# Patient Record
Sex: MALE | Race: WHITE | NOT HISPANIC OR LATINO | Employment: FULL TIME | URBAN - METROPOLITAN AREA
[De-identification: names, ages, dates, MRNs, and addresses within clinical notes are randomized per-mention and may not be internally consistent; named-entity substitution may affect disease eponyms.]

---

## 2024-03-25 ENCOUNTER — OFFICE VISIT (OUTPATIENT)
Age: 39
End: 2024-03-25
Payer: COMMERCIAL

## 2024-03-25 VITALS
WEIGHT: 249 LBS | DIASTOLIC BLOOD PRESSURE: 88 MMHG | HEART RATE: 78 BPM | HEIGHT: 76 IN | SYSTOLIC BLOOD PRESSURE: 132 MMHG | OXYGEN SATURATION: 96 % | BODY MASS INDEX: 30.32 KG/M2

## 2024-03-25 DIAGNOSIS — I10 PRIMARY HYPERTENSION: ICD-10-CM

## 2024-03-25 DIAGNOSIS — E78.2 MIXED HYPERLIPIDEMIA: ICD-10-CM

## 2024-03-25 DIAGNOSIS — J30.9 ALLERGIC RHINITIS, UNSPECIFIED SEASONALITY, UNSPECIFIED TRIGGER: ICD-10-CM

## 2024-03-25 DIAGNOSIS — J45.20 MILD INTERMITTENT ASTHMA, UNSPECIFIED WHETHER COMPLICATED: ICD-10-CM

## 2024-03-25 DIAGNOSIS — E11.9 TYPE 2 DIABETES MELLITUS WITHOUT COMPLICATION, WITHOUT LONG-TERM CURRENT USE OF INSULIN: Primary | ICD-10-CM

## 2024-03-25 PROBLEM — I25.10 CORONARY ARTERY DISEASE INVOLVING NATIVE CORONARY ARTERY OF NATIVE HEART WITHOUT ANGINA PECTORIS: Status: ACTIVE | Noted: 2023-11-30

## 2024-03-25 PROBLEM — Z82.49 FAMILY HISTORY OF PREMATURE CAD: Status: ACTIVE | Noted: 2021-06-21

## 2024-03-25 PROBLEM — I47.29 NONSUSTAINED PAROXYSMAL VENTRICULAR TACHYCARDIA: Status: ACTIVE | Noted: 2019-02-25

## 2024-03-25 LAB
EXPIRATION DATE: ABNORMAL
HBA1C MFR BLD: 7 % (ref 4.5–5.7)
Lab: ABNORMAL

## 2024-03-25 RX ORDER — HYDROCHLOROTHIAZIDE 25 MG/1
25 TABLET ORAL DAILY
COMMUNITY
Start: 2024-02-24

## 2024-03-25 RX ORDER — TIRZEPATIDE 5 MG/.5ML
5 INJECTION, SOLUTION SUBCUTANEOUS WEEKLY
COMMUNITY
Start: 2024-01-19 | End: 2024-03-25

## 2024-03-25 RX ORDER — VALSARTAN 160 MG/1
160 TABLET ORAL DAILY
COMMUNITY
Start: 2024-03-25

## 2024-03-25 RX ORDER — PROPRANOLOL HYDROCHLORIDE 10 MG/1
10 TABLET ORAL AS NEEDED
COMMUNITY

## 2024-03-25 RX ORDER — AMLODIPINE BESYLATE 10 MG/1
10 TABLET ORAL DAILY
COMMUNITY
Start: 2023-11-27

## 2024-03-25 RX ORDER — ASPIRIN 81 MG/1
81 TABLET, COATED ORAL DAILY
COMMUNITY
Start: 2024-03-18

## 2024-03-25 RX ORDER — ALBUTEROL SULFATE 90 UG/1
2 AEROSOL, METERED RESPIRATORY (INHALATION) EVERY 4 HOURS PRN
COMMUNITY
Start: 2024-03-25

## 2024-03-25 RX ORDER — METFORMIN HYDROCHLORIDE 500 MG/1
1000 TABLET, EXTENDED RELEASE ORAL
COMMUNITY
Start: 2024-03-18

## 2024-03-25 RX ORDER — SIMVASTATIN 20 MG
20 TABLET ORAL DAILY
COMMUNITY
Start: 2023-10-09 | End: 2024-10-09

## 2024-03-25 NOTE — PROGRESS NOTES
New Patient Note    Subjective      Johnny is a 39 y.o. male.    Chief Complaint   Patient presents with    Establish Care    Wheezing     Not sure if it is allergies. Tested negative.        HPI    Lived in [place] previously.     Diabetes Type II  - dx around 9 years ago  - Metformin 1000mg ER  - started Dec Mounjaro, helpful with weight, back ordered for 6-8 weeks,   - very happy with DM control and weight impact  - last eye exam about 18months ago  - no difficulty with feet, aquaphor  - no neuropathy    HTN  - Amlodipine 10mg, HCTZ 25, Propranolol 10 PRN anxiety, Valsartan 160  - no cp, edema, no HA from BP    Anxiety  - stopped Welbutrin awhile ago, no big differenc  - Propranolol PRN  - worry around health  - worry with fam history of heart disease    HPL  - on Simvastatin     Allergic Rhinitis  - wife sick  - some persistent runny nose and cough  - Albuterol for wheeze  - Zyrtec on occasion  - no nasal spray  - bad asthma as child    Transaminitis  - ALT 60  - US of liver fatty liver   - no further workup    Past Medical History:  Patient Active Problem List   Diagnosis    Coronary artery disease involving native coronary artery of native heart without angina pectoris    Family history of premature CAD    Mixed hyperlipidemia    Nonsustained paroxysmal ventricular tachycardia    Type 2 diabetes mellitus    White coat syndrome with diagnosis of hypertension        Medications:    Current Outpatient Medications:     amLODIPine (NORVASC) 10 MG tablet, Take 1 tablet by mouth Daily., Disp: , Rfl:     Aspirin Low Dose 81 MG EC tablet, Take 1 tablet by mouth Daily., Disp: , Rfl:     hydroCHLOROthiazide 25 MG tablet, Take 1 tablet by mouth Daily., Disp: , Rfl:     metFORMIN ER (GLUCOPHAGE-XR) 500 MG 24 hr tablet, Take 2 tablets by mouth Daily With Breakfast., Disp: , Rfl:     propranolol (INDERAL) 10 MG tablet, Take 1 tablet by mouth As Needed., Disp: , Rfl:     simvastatin (ZOCOR) 20 MG tablet, Take 1 tablet by  "mouth Daily., Disp: , Rfl:     albuterol sulfate  (90 Base) MCG/ACT inhaler, Inhale 2 puffs Every 4 (Four) Hours As Needed for Wheezing., Disp: , Rfl:     Tirzepatide (Mounjaro) 2.5 MG/0.5ML solution pen-injector pen, Inject 0.5 mL under the skin into the appropriate area as directed 1 (One) Time Per Week., Disp: 2 mL, Rfl: 1    valsartan (DIOVAN) 160 MG tablet, Take 1 tablet by mouth Daily., Disp: , Rfl:      Allergy to Medications:  Allergies   Allergen Reactions    Atorvastatin Myalgia    Escitalopram Other (See Comments)     ED    Lisinopril Nausea Only     Lightheaded, sob    Sertraline Other (See Comments)     ED        Immunizations:  Immunization History   Administered Date(s) Administered    COVID-19 (PFIZER) Purple Cap Monovalent 04/10/2021, 05/01/2021       Surgeries:  History reviewed. No pertinent surgical history.     Family History:  Family History   Problem Relation Age of Onset    Heart disease Mother     Coronary artery disease Other         Multiple family members with early CAD on Dad's side    Colon cancer Neg Hx     Prostate cancer Neg Hx     Stroke Neg Hx         Social:  Social History     Socioeconomic History    Marital status:    Tobacco Use    Smoking status: Never    Smokeless tobacco: Never   Vaping Use    Vaping status: Never Used   Substance and Sexual Activity    Alcohol use: Not Currently    Drug use: Never    Sexual activity: Yes     Partners: Female       Household:  Occupation:  Hobbies:  Exercise:      Objective   /88   Pulse 78   Ht 193 cm (76\")   Wt 113 kg (249 lb)   SpO2 96%   BMI 30.31 kg/m²   BMI is >= 30 and <35. (Class 1 Obesity). The following options were offered after discussion;: exercise counseling/recommendations and nutrition counseling/recommendations       Physical Exam  Vitals reviewed.   Constitutional:       General: He is not in acute distress.  HENT:      Nose: Nose normal.      Mouth/Throat:      Mouth: Mucous membranes are moist. "   Eyes:      Conjunctiva/sclera: Conjunctivae normal.   Cardiovascular:      Rate and Rhythm: Normal rate and regular rhythm.      Heart sounds: Normal heart sounds. No murmur heard.  Pulmonary:      Effort: Pulmonary effort is normal. No respiratory distress.      Breath sounds: Normal breath sounds.   Abdominal:      General: Abdomen is flat. Bowel sounds are normal. There is no distension.      Palpations: Abdomen is soft.      Tenderness: There is no abdominal tenderness.   Skin:     General: Skin is warm and dry.   Neurological:      Mental Status: He is alert.   Psychiatric:         Thought Content: Thought content normal.         Judgment: Judgment normal.         Assessment & Plan     Diabetes Type II  - Controlled  - A1c today 7, pt reports this is up from in the 5s, he has not been able to find Mounjaro 7.5mg, so he has been out of Mounjaro  - continue Metformin 1000mg ER  - started Mounjaro in Dec, helpful with weight, back ordered for 6-8 weeks, will resume today at Mounjaro 2.5mg, script sent  - last eye exam about 18months ago  - will perform DM foot exam next visit  - checks feet  - microalbumin obtained today  - reviewed OSH 12/2023 Lipids and CMP  - no neuropathy    HTN  - Continue Amlodipine 10mg, HCTZ 25, Valsartan 160mg daily, Propranolol 10 PRN anxiety    Anxiety  - stopped Welbutrin awhile ago, no big difference noted  - continue Propranolol PRN    HPL  - continue Simvastatin   - Lipids checked 12/2023    Allergic Rhinitis  - start Zyrtec    Intermittent Asthma  - Albuterol PRN, current use is BID  - advised if that continues should consider controller with steroid inhaler or Singulair    Transaminitis  - ALT 60  - US of liver indicated fatty liver   - no further workup completed  - last CMP 12/2023    CAD, Nonsustained Paroxysmal VT  - dx came across in EPIC post visit, will discuss further at next appt  - significant fam history of early CAD    Health Care Maintenance:  Hep C  Screen:declined    Colon Ca Screening: Start Screening at 44yo / no fam history    Immunizations:Tdap 2018, will discuss Hep B     Lipids:12/2023  A1C:today    FU in 3mo for annual, DM2    Emma Kathleen MD, FAAP, FACP  Internal Medicine and Pediatrics  [facility]

## 2024-03-27 DIAGNOSIS — E11.9 TYPE 2 DIABETES MELLITUS WITHOUT COMPLICATION, WITHOUT LONG-TERM CURRENT USE OF INSULIN: ICD-10-CM

## 2024-03-27 PROCEDURE — 82043 UR ALBUMIN QUANTITATIVE: CPT | Performed by: INTERNAL MEDICINE

## 2024-03-28 LAB — ALBUMIN UR-MCNC: 4.7 MG/DL

## 2024-04-30 ENCOUNTER — PATIENT MESSAGE (OUTPATIENT)
Age: 39
End: 2024-04-30
Payer: COMMERCIAL

## 2024-04-30 DIAGNOSIS — E11.65 TYPE 2 DIABETES MELLITUS WITH HYPERGLYCEMIA, WITHOUT LONG-TERM CURRENT USE OF INSULIN: Primary | ICD-10-CM

## 2024-06-25 ENCOUNTER — OFFICE VISIT (OUTPATIENT)
Age: 39
End: 2024-06-25
Payer: COMMERCIAL

## 2024-06-25 VITALS
HEART RATE: 76 BPM | DIASTOLIC BLOOD PRESSURE: 90 MMHG | SYSTOLIC BLOOD PRESSURE: 144 MMHG | HEIGHT: 76 IN | BODY MASS INDEX: 29.96 KG/M2 | WEIGHT: 246 LBS | RESPIRATION RATE: 16 BRPM | OXYGEN SATURATION: 96 %

## 2024-06-25 DIAGNOSIS — E78.2 MIXED HYPERLIPIDEMIA: ICD-10-CM

## 2024-06-25 DIAGNOSIS — E11.9 TYPE 2 DIABETES MELLITUS WITHOUT COMPLICATION, UNSPECIFIED WHETHER LONG TERM INSULIN USE: ICD-10-CM

## 2024-06-25 DIAGNOSIS — Z00.00 WELLNESS EXAMINATION: Primary | ICD-10-CM

## 2024-06-25 DIAGNOSIS — Z23 ENCOUNTER FOR ADMINISTRATION OF VACCINE: ICD-10-CM

## 2024-06-25 LAB
EXPIRATION DATE: ABNORMAL
HBA1C MFR BLD: 7.3 % (ref 4.5–5.7)
Lab: ABNORMAL

## 2024-06-25 PROCEDURE — 90677 PCV20 VACCINE IM: CPT | Performed by: INTERNAL MEDICINE

## 2024-06-25 PROCEDURE — 90471 IMMUNIZATION ADMIN: CPT | Performed by: INTERNAL MEDICINE

## 2024-06-25 PROCEDURE — 83036 HEMOGLOBIN GLYCOSYLATED A1C: CPT | Performed by: INTERNAL MEDICINE

## 2024-06-25 PROCEDURE — 99396 PREV VISIT EST AGE 40-64: CPT | Performed by: INTERNAL MEDICINE

## 2024-06-25 RX ORDER — EZETIMIBE 10 MG/1
10 TABLET ORAL DAILY
COMMUNITY
Start: 2024-06-25

## 2024-06-25 NOTE — LETTER
[facility]  Vaccine Consent Form    Patient Name:  Johnny Esteves  Patient :  1985     Vaccine(s) Ordered    Pneumococcal Conjugate Vaccine 20-Valent All        Screening Checklist  The following questions should be completed prior to vaccination. If you answer “yes” to any question, it does not necessarily mean you should not be vaccinated. It just means we may need to clarify or ask more questions. If a question is unclear, please ask your healthcare provider to explain it.    Yes No   Any fever or moderate to severe illness today (mild illness and/or antibiotic treatment are not contraindications)?     Do you have a history of a serious reaction to any previous vaccinations, such as anaphylaxis, encephalopathy within 7 days, Guillain-Ankeny syndrome within 6 weeks, seizure?     Have you received any live vaccine(s) (e.g MMR, RADHA) or any other vaccines in the last month (to ensure duplicate doses aren't given)?     Do you have an anaphylactic allergy to latex (DTaP, DTaP-IPV, Hep A, Hep B, MenB, RV, Td, Tdap), baker’s yeast (Hep B, HPV), polysorbates (RSV, nirsevimab, PCV 20, Rotavirrus, Tdap, Shingrix), or gelatin (RADHA, MMR)?     Do you have an anaphylactic allergy to neomycin (Rabies, RADHA, MMR, IPV, Hep A), polymyxin B (IPV), or streptomycin (IPV)?      Any cancer, leukemia, AIDS, or other immune system disorder? (RADHA, MMR, RV)     Do you have a parent, brother, or sister with an immune system problem (if immune competence of vaccine recipient clinically verified, can proceed)? (MMR, RADHA)     Any recent steroid treatments for >2 weeks, chemotherapy, or radiation treatment? (RADHA, MMR)     Have you received antibody-containing blood transfusions or IVIG in the past 11 months (recommended interval is dependent on product)? (MMR, RADHA)     Have you taken antiviral drugs (acyclovir, famciclovir, valacyclovir for RADHA) in the last 24 or 48 hours, respectively?      Are you pregnant or planning to become pregnant  "within 1 month? (RADHA, MMR, HPV, IPV, MenB, Abrexvy; For Hep B- refer to Engerix-B; For RSV - Abrysvo is indicated for 32-36 weeks of pregnancy from September to January)     For infants, have you ever been told your child has had intussusception or a medical emergency involving obstruction of the intestine (Rotavirus)? If not for an infant, can skip this question.         *Ordering Physicians/APC should be consulted if \"yes\" is checked by the patient or guardian above.  I have received, read, and understand the Vaccine Information Statement (VIS) for each vaccine ordered.  I have considered my or my child's health status as well as the health status of my close contacts.  I have taken the opportunity to discuss my vaccine questions with my or my child's health care provider.   I have requested that the ordered vaccine(s) be given to me or my child.  I understand the benefits and risks of the vaccines.  I understand that I should remain in the clinic for 15 minutes after receiving the vaccine(s).  _________________________________________________________  Signature of Patient or Parent/Legal Guardian ____________________  Date     "

## 2024-06-25 NOTE — PROGRESS NOTES
Subjective      Johnny Arroyowillow     [place]     Mr. Esteves is here today for their annual visit.     General Health:  Reported Health: Good    Social History:  Household Members: Spouse / 2 dogs  Marital Status:     Housing Situation: Stable   Work Status: Employed Full Time  Occupation:Work from home,   Hobbies/ Travel:    General Health:  Diet: variety of things, likes veggies, decreased red meat, does some vegetarian replacement  Caffeine: coffee 1 cup in am  Calcium Intake:almond milk, cheese some, 2-3 per day  Weight Concerns:BMI 29  Supplements:no  Exercise:walk a dog  Screen Time:2hr    Dental Exam:month ago, q6mo  Dental Concerns:Invisiline     Vision Exam:yesterday  Vision Concerns:no    Hearing Loss:maybe some, earwax    Risky Behaviors:  Seatbelt Use:yes  Texting While Driving:no    Tobacco Use:no  If positive:     pack years    Alcohol Use:no  If positive, consider AUDITC    Drug Use:no  If positive, consider DAST    Reproductive Health:    BPH Symptoms: Difficulty initiating stream / Loss of Strength of Stream / Frequently getting up to urinate at Night: no  History of Prostate Cancer in Family:no      Mood:  Depression Screening:    PHQ-9 Depression Screening  Little interest or pleasure in doing things?     Feeling down, depressed, or hopeless?     Trouble falling or staying asleep, or sleeping too much?     Feeling tired or having little energy?     Poor appetite or overeating?     Feeling bad about yourself - or that you are a failure or have let yourself or your family down?     Trouble concentrating on things, such as reading the newspaper or watching television?     Moving or speaking so slowly that other people could have noticed? Or the opposite - being so fidgety or restless that you have been moving around a lot more than usual?     Thoughts that you would be better off dead, or of hurting yourself in some way?     PHQ-9 Total Score     If you checked off any problems, how difficult  have these problems made it for you to do your work, take care of things at home, or get along with other people?        PHQ-9 Total Score:       Anxiety: GAD7 Score:    Past Medical History:  Patient Active Problem List   Diagnosis    Coronary artery disease involving native coronary artery of native heart without angina pectoris    Family history of premature CAD    Mixed hyperlipidemia    Nonsustained paroxysmal ventricular tachycardia    Type 2 diabetes mellitus    Primary hypertension    Allergic rhinitis    Mild intermittent asthma       Allergies:  Allergies   Allergen Reactions    Atorvastatin Myalgia    Escitalopram Other (See Comments)     ED    Lisinopril Nausea Only     Lightheaded, sob    Sertraline Other (See Comments)     ED       Medications:    Current Outpatient Medications:     albuterol sulfate  (90 Base) MCG/ACT inhaler, Inhale 2 puffs Every 4 (Four) Hours As Needed for Wheezing., Disp: , Rfl:     amLODIPine (NORVASC) 10 MG tablet, Take 1 tablet by mouth Daily., Disp: , Rfl:     Aspirin Low Dose 81 MG EC tablet, Take 1 tablet by mouth Daily., Disp: , Rfl:     ezetimibe (ZETIA) 10 MG tablet, Take 1 tablet by mouth Daily., Disp: , Rfl:     hydroCHLOROthiazide 25 MG tablet, Take 1 tablet by mouth Daily., Disp: , Rfl:     metFORMIN ER (GLUCOPHAGE-XR) 500 MG 24 hr tablet, Take 2 tablets by mouth Daily With Breakfast., Disp: , Rfl:     propranolol (INDERAL) 10 MG tablet, Take 1 tablet by mouth As Needed., Disp: , Rfl:     simvastatin (ZOCOR) 20 MG tablet, Take 1 tablet by mouth Daily., Disp: , Rfl:     Tirzepatide (Mounjaro) 5 MG/0.5ML solution pen-injector pen, Inject 0.5 mL under the skin into the appropriate area as directed 1 (One) Time Per Week., Disp: 2 mL, Rfl: 1    valsartan (DIOVAN) 160 MG tablet, Take 2 tablets by mouth Daily., Disp: , Rfl:     Surgical History:  History reviewed. No pertinent surgical history.     Family History:  Family History   Problem Relation Age of Onset     Heart disease Mother     Coronary artery disease Other         Multiple family members with early CAD on Dad's side    Colon cancer Neg Hx     Prostate cancer Neg Hx     Stroke Neg Hx        Any change in family history since last annual visit: no  Any family history of colon cancer, breast cancer, prostate cancer, early CAD: see fam history    Family History   Problem Relation Age of Onset    Heart disease Mother     Coronary artery disease Other         Multiple family members with early CAD on Dad's side    Colon cancer Neg Hx     Prostate cancer Neg Hx     Stroke Neg Hx         The following portions of the patient's chart were reviewed in this encounter and updated as appropriate: Past Medical History, Surgical History, Family History, and Social History         Review of Systems      Objective       Vitals:    06/25/24 1556   BP: 144/90   Pulse: 76   Resp: 16   SpO2: 96%               Physical Exam  Vitals reviewed.   Constitutional:       General: He is not in acute distress.  HENT:      Nose: Nose normal.      Mouth/Throat:      Mouth: Mucous membranes are moist.   Eyes:      Conjunctiva/sclera: Conjunctivae normal.   Cardiovascular:      Rate and Rhythm: Normal rate and regular rhythm.      Heart sounds: Normal heart sounds. No murmur heard.  Pulmonary:      Effort: Pulmonary effort is normal. No respiratory distress.      Breath sounds: Normal breath sounds.   Abdominal:      General: Abdomen is flat. Bowel sounds are normal.      Palpations: Abdomen is soft.   Neurological:      Mental Status: He is alert.      Comments: Monofilament testing negative bilaterally   Psychiatric:         Mood and Affect: Mood normal.         Thought Content: Thought content normal.         Judgment: Judgment normal.     }     Assessment & Plan      Today was a preventative health visit: Patient was counseled on the following:  Lifestyle Changes: Weight Loss, Diet, Exercise  Calcium and Vitamin D Supplementation and Weight  Bearing Exercise for Bone Health  Safety with driving  Work and Life Balance    DM2 - A1C 7.3, having difficulty finding Mounjaro, will try again this month for 7.5    HTN - saw Cards today and increased Valsartan to 320    HPL - Simvastatin continued, Zetia added. Will check CMP, lipids, apob and hsCRP as Cards out of state at this time    Health Maintenance:    Infectious Disease Screening:  One Time HIV Screen: pt felt was low risk  One Time Hepatitis C Screen: pt felt was low risk    Vaccinations:  Tdap:  Hep A:  Hep B:  Shingles:  PPSV:  PCV:given today  COVID:  Flu:     Cancer Screening:  Colonoscopy: start at age 45yo  Prostate Cancer Screening: will discuss when age appropriate  Lung Cancer Screening: N/A nonsmoker     CV Screening:  Lipids: Last date completed:   Next Due: today  A1C: Last date completed:   Next Due: today    BP not at goal < 130/80. Cards increased Valsartan today    Mood: PHQ 2 Negative    Recommended: Dental Visit / Eye Exam    Bone Health: Recommended  appropriate vitamin D and Calcium intake    FU in three months    Emma Kathleen MD, FAAP, FACP  Internal Medicine and Pediatrics  [facility]

## 2024-07-02 ENCOUNTER — LAB (OUTPATIENT)
Age: 39
End: 2024-07-02
Payer: COMMERCIAL

## 2024-07-02 DIAGNOSIS — E78.2 MIXED HYPERLIPIDEMIA: ICD-10-CM

## 2024-07-02 DIAGNOSIS — E78.2 MIXED HYPERLIPIDEMIA: Primary | ICD-10-CM

## 2024-07-02 DIAGNOSIS — E11.69 TYPE 2 DIABETES MELLITUS WITH OTHER SPECIFIED COMPLICATION, UNSPECIFIED WHETHER LONG TERM INSULIN USE: ICD-10-CM

## 2024-07-02 DIAGNOSIS — E11.9 TYPE 2 DIABETES MELLITUS WITHOUT COMPLICATION, UNSPECIFIED WHETHER LONG TERM INSULIN USE: ICD-10-CM

## 2024-07-02 LAB
ALBUMIN SERPL-MCNC: 4.8 G/DL (ref 3.5–5.2)
ALBUMIN/GLOB SERPL: 1.8 G/DL
ALP SERPL-CCNC: 76 U/L (ref 39–117)
ALT SERPL W P-5'-P-CCNC: 85 U/L (ref 1–41)
ANION GAP SERPL CALCULATED.3IONS-SCNC: 13.4 MMOL/L (ref 5–15)
AST SERPL-CCNC: 47 U/L (ref 1–40)
BILIRUB SERPL-MCNC: 0.5 MG/DL (ref 0–1.2)
BUN SERPL-MCNC: 14 MG/DL (ref 6–20)
BUN/CREAT SERPL: 12.3 (ref 7–25)
CALCIUM SPEC-SCNC: 10.1 MG/DL (ref 8.6–10.5)
CHLORIDE SERPL-SCNC: 101 MMOL/L (ref 98–107)
CO2 SERPL-SCNC: 25.6 MMOL/L (ref 22–29)
CREAT SERPL-MCNC: 1.14 MG/DL (ref 0.76–1.27)
CRP SERPL-MCNC: 0.13 MG/DL (ref 0.01–0.5)
EGFRCR SERPLBLD CKD-EPI 2021: 83.4 ML/MIN/1.73
GLOBULIN UR ELPH-MCNC: 2.7 GM/DL
GLUCOSE SERPL-MCNC: 122 MG/DL (ref 65–99)
POTASSIUM SERPL-SCNC: 4 MMOL/L (ref 3.5–5.2)
PROT SERPL-MCNC: 7.5 G/DL (ref 6–8.5)
SODIUM SERPL-SCNC: 140 MMOL/L (ref 136–145)

## 2024-07-02 PROCEDURE — 86141 C-REACTIVE PROTEIN HS: CPT | Performed by: INTERNAL MEDICINE

## 2024-07-02 PROCEDURE — 80053 COMPREHEN METABOLIC PANEL: CPT | Performed by: INTERNAL MEDICINE

## 2024-07-02 PROCEDURE — 36415 COLL VENOUS BLD VENIPUNCTURE: CPT | Performed by: INTERNAL MEDICINE

## 2024-07-04 LAB
APO B SERPL-MCNC: 79 MG/DL
CHOLEST SERPL-MCNC: 136 MG/DL (ref 100–199)
DIABETES RISK SCORE CALC: 78 (ref 0–49)
GLYCA SERPL-SCNC: 456 UMOL/L
HDLC SERPL-MCNC: 31 MG/DL
LDLC SERPL CALC-MCNC: 71 MG/DL (ref 0–99)
Lab: ABNORMAL
NONHDLC SERPL-MCNC: 105 MG/DL (ref 0–129)
TRIGL SERPL-MCNC: 206 MG/DL (ref 0–149)

## 2024-07-05 ENCOUNTER — PATIENT MESSAGE (OUTPATIENT)
Age: 39
End: 2024-07-05
Payer: COMMERCIAL

## 2024-08-21 ENCOUNTER — PATIENT MESSAGE (OUTPATIENT)
Age: 39
End: 2024-08-21
Payer: COMMERCIAL

## 2024-08-21 DIAGNOSIS — I47.29 NONSUSTAINED PAROXYSMAL VENTRICULAR TACHYCARDIA: ICD-10-CM

## 2024-08-21 DIAGNOSIS — Z82.49 FAMILY HISTORY OF PREMATURE CAD: Primary | ICD-10-CM

## 2024-08-21 DIAGNOSIS — E78.2 MIXED HYPERLIPIDEMIA: ICD-10-CM

## 2024-09-09 ENCOUNTER — LAB (OUTPATIENT)
Age: 39
End: 2024-09-09
Payer: COMMERCIAL

## 2024-09-26 ENCOUNTER — LAB (OUTPATIENT)
Age: 39
End: 2024-09-26
Payer: COMMERCIAL

## 2024-09-26 ENCOUNTER — OFFICE VISIT (OUTPATIENT)
Age: 39
End: 2024-09-26
Payer: COMMERCIAL

## 2024-09-26 VITALS
SYSTOLIC BLOOD PRESSURE: 126 MMHG | HEIGHT: 76 IN | BODY MASS INDEX: 28.74 KG/M2 | WEIGHT: 236 LBS | OXYGEN SATURATION: 98 % | HEART RATE: 81 BPM | DIASTOLIC BLOOD PRESSURE: 80 MMHG

## 2024-09-26 DIAGNOSIS — Z23 ENCOUNTER FOR ADMINISTRATION OF VACCINE: ICD-10-CM

## 2024-09-26 DIAGNOSIS — R74.01 TRANSAMINITIS: ICD-10-CM

## 2024-09-26 DIAGNOSIS — E78.2 MIXED HYPERLIPIDEMIA: ICD-10-CM

## 2024-09-26 DIAGNOSIS — E11.9 TYPE 2 DIABETES MELLITUS WITHOUT COMPLICATION, WITHOUT LONG-TERM CURRENT USE OF INSULIN: Primary | ICD-10-CM

## 2024-09-26 DIAGNOSIS — J45.20 MILD INTERMITTENT ASTHMA, UNSPECIFIED WHETHER COMPLICATED: ICD-10-CM

## 2024-09-26 DIAGNOSIS — I10 PRIMARY HYPERTENSION: ICD-10-CM

## 2024-09-26 DIAGNOSIS — J30.9 ALLERGIC RHINITIS, UNSPECIFIED SEASONALITY, UNSPECIFIED TRIGGER: ICD-10-CM

## 2024-09-26 LAB
EXPIRATION DATE: ABNORMAL
FERRITIN SERPL-MCNC: 79.1 NG/ML (ref 30–400)
HBA1C MFR BLD: 6.1 % (ref 4.5–5.7)
HBV SURFACE AB SER RIA-ACNC: NORMAL
HBV SURFACE AG SERPL QL IA: NORMAL
HCV AB SER QL: NORMAL
IGA1 MFR SER: 80 MG/DL (ref 70–400)
IGG1 SER-MCNC: 1071 MG/DL (ref 700–1600)
IRON 24H UR-MRATE: 66 MCG/DL (ref 59–158)
IRON SATN MFR SERPL: 14 % (ref 20–50)
Lab: ABNORMAL
T4 FREE SERPL-MCNC: 1.16 NG/DL (ref 0.92–1.68)
TIBC SERPL-MCNC: 459 MCG/DL (ref 298–536)
TRANSFERRIN SERPL-MCNC: 308 MG/DL (ref 200–360)
TSH SERPL DL<=0.05 MIU/L-ACNC: 1.34 UIU/ML (ref 0.27–4.2)

## 2024-09-26 PROCEDURE — 87340 HEPATITIS B SURFACE AG IA: CPT | Performed by: INTERNAL MEDICINE

## 2024-09-26 PROCEDURE — 84466 ASSAY OF TRANSFERRIN: CPT | Performed by: INTERNAL MEDICINE

## 2024-09-26 PROCEDURE — 90471 IMMUNIZATION ADMIN: CPT | Performed by: INTERNAL MEDICINE

## 2024-09-26 PROCEDURE — 82728 ASSAY OF FERRITIN: CPT | Performed by: INTERNAL MEDICINE

## 2024-09-26 PROCEDURE — 36415 COLL VENOUS BLD VENIPUNCTURE: CPT | Performed by: INTERNAL MEDICINE

## 2024-09-26 PROCEDURE — 86706 HEP B SURFACE ANTIBODY: CPT | Performed by: INTERNAL MEDICINE

## 2024-09-26 PROCEDURE — 84443 ASSAY THYROID STIM HORMONE: CPT | Performed by: INTERNAL MEDICINE

## 2024-09-26 PROCEDURE — 90656 IIV3 VACC NO PRSV 0.5 ML IM: CPT | Performed by: INTERNAL MEDICINE

## 2024-09-26 PROCEDURE — 86803 HEPATITIS C AB TEST: CPT | Performed by: INTERNAL MEDICINE

## 2024-09-26 PROCEDURE — 84439 ASSAY OF FREE THYROXINE: CPT | Performed by: INTERNAL MEDICINE

## 2024-09-26 PROCEDURE — 83036 HEMOGLOBIN GLYCOSYLATED A1C: CPT | Performed by: INTERNAL MEDICINE

## 2024-09-26 PROCEDURE — 82784 ASSAY IGA/IGD/IGG/IGM EACH: CPT | Performed by: INTERNAL MEDICINE

## 2024-09-26 PROCEDURE — 99214 OFFICE O/P EST MOD 30 MIN: CPT | Performed by: INTERNAL MEDICINE

## 2024-09-26 PROCEDURE — 83540 ASSAY OF IRON: CPT | Performed by: INTERNAL MEDICINE

## 2024-09-27 LAB
ANA SER QL: NEGATIVE
HAV AB SER QL IA: POSITIVE
HBV CORE AB SERPL QL IA: NEGATIVE
LKM-1 AB SER-ACNC: 2.7 UNITS (ref 0–20)
TTG IGA SER-ACNC: <2 U/ML (ref 0–3)

## 2024-09-30 ENCOUNTER — PATIENT MESSAGE (OUTPATIENT)
Age: 39
End: 2024-09-30
Payer: COMMERCIAL

## 2024-10-01 ENCOUNTER — OFFICE VISIT (OUTPATIENT)
Dept: CARDIOLOGY | Facility: CLINIC | Age: 39
End: 2024-10-01
Payer: COMMERCIAL

## 2024-10-01 VITALS
BODY MASS INDEX: 28.81 KG/M2 | OXYGEN SATURATION: 98 % | SYSTOLIC BLOOD PRESSURE: 162 MMHG | DIASTOLIC BLOOD PRESSURE: 64 MMHG | HEART RATE: 76 BPM | WEIGHT: 236.6 LBS | HEIGHT: 76 IN

## 2024-10-01 DIAGNOSIS — I25.10 CORONARY ARTERY DISEASE INVOLVING NATIVE CORONARY ARTERY OF NATIVE HEART WITHOUT ANGINA PECTORIS: Primary | ICD-10-CM

## 2024-10-01 DIAGNOSIS — I10 PRIMARY HYPERTENSION: ICD-10-CM

## 2024-10-01 DIAGNOSIS — E78.2 MIXED HYPERLIPIDEMIA: ICD-10-CM

## 2024-10-01 DIAGNOSIS — I47.29 NONSUSTAINED PAROXYSMAL VENTRICULAR TACHYCARDIA: ICD-10-CM

## 2024-10-01 PROCEDURE — 99204 OFFICE O/P NEW MOD 45 MIN: CPT | Performed by: INTERNAL MEDICINE

## 2024-10-01 PROCEDURE — 93000 ELECTROCARDIOGRAM COMPLETE: CPT | Performed by: INTERNAL MEDICINE

## 2024-10-01 NOTE — PROGRESS NOTES
[facility] Cardiology  Office Note  Johnny Esteves  1985  [address]  [facility] [address]     VISIT DATE:  10/01/24    PCP: Emma Kathleen MD  [address]  [facility] [id number]    CC: Coronary disease  Chief Complaint   Patient presents with    Family history of Coronary Artery Disease     NP       PROBLEM LIST:    Mild coronary disease mostly located in the left anterior descending artery on coronary CT July 2022  Hypertension  Hyperlipidemia  Family history of coronary disease    Allergies  Allergies   Allergen Reactions    Atorvastatin Myalgia    Escitalopram Other (See Comments)     ED    Lisinopril Nausea Only     Lightheaded, sob    Sertraline Other (See Comments)     ED       Current Medications    Current Outpatient Medications:     albuterol sulfate  (90 Base) MCG/ACT inhaler, Inhale 2 puffs Every 4 (Four) Hours As Needed for Wheezing., Disp: , Rfl:     amLODIPine (NORVASC) 10 MG tablet, Take 1 tablet by mouth Daily., Disp: , Rfl:     Aspirin Low Dose 81 MG EC tablet, Take 1 tablet by mouth Daily., Disp: , Rfl:     hydroCHLOROthiazide 25 MG tablet, Take 1 tablet by mouth Daily., Disp: , Rfl:     metFORMIN ER (GLUCOPHAGE-XR) 500 MG 24 hr tablet, Take 2 tablets by mouth Daily With Breakfast., Disp: , Rfl:     propranolol (INDERAL) 10 MG tablet, Take 1 tablet by mouth As Needed., Disp: , Rfl:     simvastatin (ZOCOR) 20 MG tablet, Take 1 tablet by mouth Daily., Disp: , Rfl:     Tirzepatide (Mounjaro) 7.5 MG/0.5ML solution pen-injector pen, Inject 0.5 mL under the skin into the appropriate area as directed 1 (One) Time Per Week., Disp: 2 mL, Rfl: 3    valsartan (DIOVAN) 160 MG tablet, Take 2 tablets by mouth Daily., Disp: , Rfl:      History of Present Illness   Johnny Esteves is a 39 y.o. year old male who presents for consult from Emma Kathleen MD for evaluation of coronary disease.  Patient doing relatively well at this time.  Patient  "walks 2 large dogs proximally 1 to 2 miles a day.  Patient denies any chest pain pressure or discomfort.  No shortness of breath.  Blood pressure is controlled at home.  No palpitations.  Patient does have a family history of premature coronary disease.  Patient underwent cardiac CTA after having an increase in calcium score.  Patient denies any real change in symptomology.  Patient has relocated to this area from [place].  Patient works remotely thus tries to get out and exercise or at least walk because of such.  Patient otherwise is compliant with his medical therapy.  Patient is trying to lose some degree of weight and has lost approximately 15 pounds at this point.  Patient did stop Zetia with improvement in back pain.    Pt denies any chest pain, dyspnea at rest, dyspnea on exertion, orthopnea, PND, palpitations, lower extremity edema, or claudication. Pt denies history of CHF, DVT, PE, MI, CVA, TIA, or rheumatic fever.     SOCIAL HX  Social History     Socioeconomic History    Marital status:    Tobacco Use    Smoking status: Never     Passive exposure: Past    Smokeless tobacco: Never   Vaping Use    Vaping status: Never Used   Substance and Sexual Activity    Alcohol use: Not Currently    Drug use: Never    Sexual activity: Yes     Partners: Female     Birth control/protection: Condom       FAMILY HX  Family History   Problem Relation Age of Onset    Heart disease Mother     Heart disease Father     Mitral valve prolapse Brother     Coronary artery disease Other         Multiple family members with early CAD on Dad's side    Colon cancer Neg Hx     Prostate cancer Neg Hx     Stroke Neg Hx        Vitals:    10/01/24 1259   BP: 162/64   BP Location: Right arm   Patient Position: Sitting   Cuff Size: Adult   Pulse: 76   SpO2: 98%   Weight: 107 kg (236 lb 9.6 oz)   Height: 193 cm (76\")     Body mass index is 28.8 kg/m².     PHYSICAL EXAMINATION:  Vitals and nursing note reviewed.   Constitutional:      " " Appearance: Healthy appearance. Not in distress.   Eyes:      Conjunctiva/sclera: Conjunctivae normal.      Pupils: Pupils are equal, round, and reactive to light.   HENT:      Nose: Nose normal.    Mouth/Throat:      Pharynx: Oropharynx is clear.   Neck:      Vascular: JVD normal.   Pulmonary:      Effort: Pulmonary effort is normal.      Breath sounds: Normal breath sounds. No wheezing. No rhonchi. No rales.   Cardiovascular:      PMI at left midclavicular line. Normal rate. Regular rhythm. Normal S1. Normal S2.       Murmurs: There is no murmur.      No gallop.  No click. No rub.   Pulses:     Intact distal pulses.   Abdominal:      General: Bowel sounds are normal.      Palpations: Abdomen is soft.      Tenderness: There is no abdominal tenderness.   Musculoskeletal: Normal range of motion.         General: No tenderness.      Cervical back: Normal range of motion. Skin:     General: Skin is warm and dry.   Neurological:      General: No focal deficit present.      Mental Status: Alert and oriented to person, place and time.      Cranial Nerves: Cranial nerves 2-12 are intact.         Diagnostic Data:  Lab Results   Component Value Date    CHLPL 136 07/02/2024    TRIG 206 (H) 07/02/2024     Lab Results   Component Value Date    GLUCOSE 122 (H) 07/02/2024    BUN 14 07/02/2024    CREATININE 1.14 07/02/2024     07/02/2024    K 4.0 07/02/2024     07/02/2024    CO2 25.6 07/02/2024     Lab Results   Component Value Date    HGBA1C 6.1 (A) 09/26/2024     No results found for: \"WBC\", \"HGB\", \"HCT\", \"PLT\"      ECG 12 Lead    Date/Time: 10/1/2024 5:03 PM  Performed by: Raymond Ramos MD    Authorized by: Raymond Ramos MD  Comparison: not compared with previous ECG   Previous ECG: no previous ECG available  Rhythm: sinus rhythm  Rate: normal  ST Segments: ST segments normal  T Waves: T waves normal  QRS axis: normal    Clinical impression: normal ECG          Advance Care Planning   ACP " discussion was declined by the patient. Patient does not have an advance directive, declines further assistance.         ASSESSMENT:  Diagnoses and all orders for this visit:    1. Coronary artery disease involving native coronary artery of native heart without angina pectoris (Primary)    2. Mixed hyperlipidemia    3. Nonsustained paroxysmal ventricular tachycardia    4. Primary hypertension        PLAN:    Will see patient back in 1 year  Goal LDL less than 70 have stopped Zetia due to side effects patient is having thus we will need to check cholesterol when he follows with his primary care physician in December  Hold on further cardiac testing at this time is up-to-date and appropriate  Did discuss further dietary changes weight loss exercise routine    Return in about 1 year (around 10/1/2025).     Raymond Ramos MD

## 2024-10-09 LAB — SMOOTH MUSCLE AB SER QL IF: ABNORMAL

## 2024-10-15 ENCOUNTER — PATIENT MESSAGE (OUTPATIENT)
Age: 39
End: 2024-10-15
Payer: COMMERCIAL

## 2025-02-20 RX ORDER — AMLODIPINE BESYLATE 10 MG/1
10 TABLET ORAL DAILY
Qty: 90 TABLET | Refills: 1 | Status: SHIPPED | OUTPATIENT
Start: 2025-02-20

## 2025-02-20 NOTE — TELEPHONE ENCOUNTER
Rx Refill Note  Requested Prescriptions     Pending Prescriptions Disp Refills    amLODIPine (NORVASC) 10 MG tablet       Sig: Take 1 tablet by mouth Daily.      Last office visit with prescribing clinician: 9/26/2024   Last telemedicine visit with prescribing clinician: Visit date not found   Next office visit with prescribing clinician: Visit date not found     Liza Damian MA  02/20/25, 17:16 EST

## 2025-03-24 NOTE — TELEPHONE ENCOUNTER
Rx Refill Note  Requested Prescriptions     Pending Prescriptions Disp Refills    hydroCHLOROthiazide 25 MG tablet       Sig: Take 1 tablet by mouth Daily.      Last office visit with prescribing clinician: 9/26/2024   Last telemedicine visit with prescribing clinician: Visit date not found   Next office visit with prescribing clinician:   Liza Damian MA  03/24/25, 16:09 EDT  Relay     Patient due for follow up

## 2025-03-25 RX ORDER — HYDROCHLOROTHIAZIDE 25 MG/1
25 TABLET ORAL DAILY
OUTPATIENT
Start: 2025-03-25

## 2025-03-25 RX ORDER — HYDROCHLOROTHIAZIDE 25 MG/1
25 TABLET ORAL DAILY
Qty: 90 TABLET | Refills: 0 | Status: SHIPPED | OUTPATIENT
Start: 2025-03-25

## 2025-03-25 NOTE — TELEPHONE ENCOUNTER
Caller: Linn Johnny Larry    Relationship: Self    Best call back number: [phone]     Requested Prescriptions:   Requested Prescriptions     Pending Prescriptions Disp Refills    hydroCHLOROthiazide 25 MG tablet       Sig: Take 1 tablet by mouth Daily.        Pharmacy where request should be sent: [facility] [id number] [address] & [address] [facility] [phone] [facility] [phone]     Last office visit with prescribing clinician: 10/1/2024   Last telemedicine visit with prescribing clinician: Visit date not found   Next office visit with prescribing clinician: 10/7/2025     Additional details provided by patient: 2 DAYS REMAINING     Does the patient have less than a 3 day supply:  [x] Yes  [] No    Would you like a call back once the refill request has been completed: [] Yes [x] No    If the office needs to give you a call back, can they leave a voicemail: [] Yes [x] No    Miriam Del Rio Rep   03/25/25 10:10 EDT

## 2025-04-11 ENCOUNTER — OFFICE VISIT (OUTPATIENT)
Age: 40
End: 2025-04-11
Payer: COMMERCIAL

## 2025-04-11 VITALS
BODY MASS INDEX: 28.37 KG/M2 | HEIGHT: 76 IN | SYSTOLIC BLOOD PRESSURE: 130 MMHG | DIASTOLIC BLOOD PRESSURE: 82 MMHG | OXYGEN SATURATION: 98 % | WEIGHT: 233 LBS | HEART RATE: 72 BPM

## 2025-04-11 DIAGNOSIS — I25.10 CORONARY ARTERY DISEASE INVOLVING NATIVE CORONARY ARTERY OF NATIVE HEART WITHOUT ANGINA PECTORIS: Primary | ICD-10-CM

## 2025-04-11 DIAGNOSIS — E11.9 TYPE 2 DIABETES MELLITUS WITHOUT COMPLICATION, WITHOUT LONG-TERM CURRENT USE OF INSULIN: ICD-10-CM

## 2025-04-11 DIAGNOSIS — J45.20 MILD INTERMITTENT ASTHMA, UNSPECIFIED WHETHER COMPLICATED: ICD-10-CM

## 2025-04-11 DIAGNOSIS — E78.2 MIXED HYPERLIPIDEMIA: ICD-10-CM

## 2025-04-11 DIAGNOSIS — I10 PRIMARY HYPERTENSION: ICD-10-CM

## 2025-04-11 LAB
ALBUMIN UR-MCNC: 2.3 MG/DL
CREAT UR-MCNC: 166.2 MG/DL
EXPIRATION DATE: NORMAL
HBA1C MFR BLD: 5.7 % (ref 4.5–5.7)
Lab: NORMAL
MICROALBUMIN/CREAT UR: 13.8 MG/G (ref 0–29)

## 2025-04-11 PROCEDURE — 82043 UR ALBUMIN QUANTITATIVE: CPT | Performed by: INTERNAL MEDICINE

## 2025-04-11 PROCEDURE — 82570 ASSAY OF URINE CREATININE: CPT | Performed by: INTERNAL MEDICINE

## 2025-04-11 NOTE — PROGRESS NOTES
Progress Note    Subjective      Johnny is a 40 y.o. male.    Chief Complaint   Patient presents with    Diabetes       HPI  Lived in [place] previously.      Diabetes Type II  - dx around 2015  - Metformin 1000mg ER  - Mounjaro 7.5mg continued  - very happy with DM control and weight impact  - last eye exam 8/2024, scheduled this month  - no difficulty with feet, aquaphor  - no neuropathy     HTN  - Amlodipine 10mg, HCTZ 25, Propranolol 10 PRN anxiety, Valsartan 320  - no cp, edema, no HA from BP     Anxiety  - stopped Welbutrin awhile ago, no big differenc  - Propranolol PRN  - worry around health  - worry with fam history of heart disease     HPL  - on Simvastatin      Allergic Rhinitis  - Albuterol for wheeze  - Zyrtec daily  - no nasal spray     Transaminitis  - ALT 60  - US of liver fatty liver   - no further workup    Past Medical History:  Patient Active Problem List   Diagnosis    Coronary artery disease involving native coronary artery of native heart without angina pectoris    Family history of premature CAD    Mixed hyperlipidemia    Nonsustained paroxysmal ventricular tachycardia    Type 2 diabetes mellitus    Primary hypertension    Allergic rhinitis    Mild intermittent asthma       Medications:  Current Outpatient Medications on File Prior to Visit   Medication Sig Dispense Refill    albuterol sulfate  (90 Base) MCG/ACT inhaler Inhale 2 puffs Every 4 (Four) Hours As Needed for Wheezing.      amLODIPine (NORVASC) 10 MG tablet Take 1 tablet by mouth Daily. 90 tablet 1    Aspirin Low Dose 81 MG EC tablet Take 1 tablet by mouth Daily.      hydroCHLOROthiazide 25 MG tablet Take 1 tablet by mouth Daily. 90 tablet 0    metFORMIN ER (GLUCOPHAGE-XR) 500 MG 24 hr tablet Take 2 tablets by mouth Daily With Breakfast.      propranolol (INDERAL) 10 MG tablet Take 1 tablet by mouth As Needed.      Tirzepatide (Mounjaro) 7.5 MG/0.5ML solution pen-injector pen Inject 0.5 mL under the skin into the appropriate  "area as directed 1 (One) Time Per Week. 2 mL 3    valsartan (DIOVAN) 160 MG tablet Take 2 tablets by mouth Daily.      simvastatin (ZOCOR) 20 MG tablet Take 1 tablet by mouth Daily.       No current facility-administered medications on file prior to visit.       Allergies:   Allergies   Allergen Reactions    Atorvastatin Myalgia    Escitalopram Other (See Comments)     ED    Lisinopril Nausea Only     Lightheaded, sob    Sertraline Other (See Comments)     ED       Immunizations:  Immunization History   Administered Date(s) Administered    COVID-19 (PFIZER) Purple Cap Monovalent 04/10/2021, 05/01/2021    Fluzone  >6mos 09/26/2024    Pneumococcal Conjugate 20-Valent (PCV20) 06/25/2024    Tdap 01/01/2018        Family History:  Family History   Problem Relation Age of Onset    Heart disease Mother     Heart disease Father     Mitral valve prolapse Brother     Coronary artery disease Other         Multiple family members with early CAD on Dad's side    Colon cancer Neg Hx     Prostate cancer Neg Hx     Stroke Neg Hx        Social History:  Social History     Socioeconomic History    Marital status:    Tobacco Use    Smoking status: Never     Passive exposure: Past    Smokeless tobacco: Never   Vaping Use    Vaping status: Never Used   Substance and Sexual Activity    Alcohol use: Not Currently    Drug use: Never    Sexual activity: Yes     Partners: Female     Birth control/protection: Condom       Objective   /82   Pulse 72   Ht 193 cm (75.98\")   Wt 106 kg (233 lb)   SpO2 98%   BMI 28.37 kg/m²     BMI is >= 25 and <30. (Overweight) The following options were offered after discussion;: exercise counseling/recommendations and nutrition counseling/recommendations       Physical Exam  Vitals reviewed.   Constitutional:       General: He is not in acute distress.  HENT:      Nose: Nose normal.      Mouth/Throat:      Mouth: Mucous membranes are moist.   Eyes:      Conjunctiva/sclera: Conjunctivae normal. "   Cardiovascular:      Rate and Rhythm: Normal rate and regular rhythm.      Heart sounds: Normal heart sounds. No murmur heard.  Pulmonary:      Effort: Pulmonary effort is normal. No respiratory distress.      Breath sounds: Normal breath sounds.   Skin:     General: Skin is warm and dry.   Neurological:      Mental Status: He is alert.   Psychiatric:         Mood and Affect: Mood normal.         Assessment & Plan     Diabetes Type II  - Controlled  - A1c today 5.7 and improved  - lost some weight  - continue Mounjaro 7.5mg  - continue Metformin 1000mg ER  - last eye exam 8/2024, normal, scheduled for this month 4/2025  - DM foot exam 6/2024  - checks feet  - microalbumin today  - 7/2024 Lipids and CMP  - no neuropathy     HTN  - Continue Amlodipine 10mg, HCTZ 25, Valsartan 320mg daily, Propranolol 10 PRN anxiety     Anxiety  - continue Propranolol PRN     HPL  - continue Simvastatin  - pt stopped Zetia due to side effects  - Lipids checked 7/2024  - following with Cards, George Castle     Allergic Rhinitis  - continue Zyrtec     Intermittent Asthma  - Albuterol PRN, current use is once ev couple of weeks   - Zyrtec daily     Transaminitis  - ALT 60  - US of liver indicated fatty liver   - 2024  labs Hep A, B,C Ferritin, Iron Studies, IgA, TTG, TSH, FT4, IgG and other autoimmune Hep Labs unremarkable except for borderline anti- smooth muscle ab pos at 1:20, will repeat with next labs, no other autoimmune labs pos, feel likely false pos in setting of fatty liver disease  - last CMP 7/2024  - FIB4 0.77, low risk for fibrosis, will obtain CBC to update FIB4 with next labs     CAD, Nonsustained Paroxysmal VT  - dx came across in EPIC post visit  - significant fam history of early CAD  - follows with George Castle    FU in 8/2025 annual     Health Care Maintenance:  Hep C Screen:declined     Colon Ca Screening: Start Screening at 43yo / no fam history     Immunizations:Tdap 2018, will discuss Hep B, Flu shot  given today     Lipids:12/2023  A1C:today     FU in 3mo for DM2      Emma Kathleen MD, FAAP, FACP  Internal Medicine and Pediatrics  [facility]

## 2025-04-12 ENCOUNTER — RESULTS FOLLOW-UP (OUTPATIENT)
Age: 40
End: 2025-04-12
Payer: COMMERCIAL

## 2025-06-26 ENCOUNTER — TELEPHONE (OUTPATIENT)
Dept: CARDIOLOGY | Facility: CLINIC | Age: 40
End: 2025-06-26
Payer: COMMERCIAL

## 2025-06-26 DIAGNOSIS — I10 PRIMARY HYPERTENSION: ICD-10-CM

## 2025-06-26 RX ORDER — HYDROCHLOROTHIAZIDE 25 MG/1
25 TABLET ORAL DAILY
Qty: 90 TABLET | Refills: 1 | Status: SHIPPED | OUTPATIENT
Start: 2025-06-26

## 2025-06-26 RX ORDER — VALSARTAN 160 MG/1
320 TABLET ORAL DAILY
Qty: 60 TABLET | Refills: 11 | Status: SHIPPED | OUTPATIENT
Start: 2025-06-26

## 2025-06-26 RX ORDER — ASPIRIN 81 MG/1
81 TABLET, COATED ORAL DAILY
Qty: 30 TABLET | Refills: 11 | Status: SHIPPED | OUTPATIENT
Start: 2025-06-26

## 2025-06-26 RX ORDER — SIMVASTATIN 20 MG
20 TABLET ORAL DAILY
Qty: 30 TABLET | Refills: 11 | Status: SHIPPED | OUTPATIENT
Start: 2025-06-26 | End: 2026-06-27

## 2025-06-26 NOTE — TELEPHONE ENCOUNTER
Caller: Johnny Esteves    Relationship: Self    Best call back number: [phone]    Requested Prescriptions:   Requested Prescriptions     Pending Prescriptions Disp Refills    simvastatin (ZOCOR) 20 MG tablet 30 tablet 11     Sig: Take 1 tablet by mouth Daily.    valsartan (DIOVAN) 160 MG tablet       Sig: Take 2 tablets by mouth Daily.    Aspirin Low Dose 81 MG EC tablet       Sig: Take 1 tablet by mouth Daily.        Pharmacy where request should be sent:      Last office visit with prescribing clinician: 10/1/2024   Last telemedicine visit with prescribing clinician: Visit date not found   Next office visit with prescribing clinician: 10/7/2025     Additional details provided by patient: THESE MEDICATIONS WERE FILLED BY A PREVIOUS DOCTOR. HE HAS ONLY SEEN  ONCE AND HE IS NOT SURE WHO HIS NEW DR WILL BE. HE IS NEARLY OUT OF HIS MEDICATIONS AND WILL NEED REFILLS. PLEASE REACH OUT TO THE PT TO UPDATE ON THE STATUS OF THIS REQUEST.     Does the patient have less than a 3 day supply:  [] Yes  [x] No    Would you like a call back once the refill request has been completed: [x] Yes [] No    If the office needs to give you a call back, can they leave a voicemail: [] Yes [x] No    Miriam Gillette Rep   06/26/25 13:35 EDT

## 2025-07-15 ENCOUNTER — APPOINTMENT (OUTPATIENT)
Facility: HOSPITAL | Age: 40
End: 2025-07-15
Payer: COMMERCIAL

## 2025-07-15 ENCOUNTER — HOSPITAL ENCOUNTER (EMERGENCY)
Facility: HOSPITAL | Age: 40
Discharge: HOME | End: 2025-07-15
Attending: EMERGENCY MEDICINE | Admitting: EMERGENCY MEDICINE
Payer: COMMERCIAL

## 2025-07-15 ENCOUNTER — PATIENT MESSAGE (OUTPATIENT)
Age: 40
End: 2025-07-15
Payer: COMMERCIAL

## 2025-07-15 VITALS
HEART RATE: 72 BPM | TEMPERATURE: 97.8 F | WEIGHT: 235.8 LBS | BODY MASS INDEX: 28.71 KG/M2 | SYSTOLIC BLOOD PRESSURE: 155 MMHG | DIASTOLIC BLOOD PRESSURE: 82 MMHG | OXYGEN SATURATION: 96 % | HEIGHT: 76 IN | RESPIRATION RATE: 18 BRPM

## 2025-07-15 DIAGNOSIS — N20.1 URETEROLITHIASIS: ICD-10-CM

## 2025-07-15 DIAGNOSIS — N13.30 HYDROURETERONEPHROSIS: Primary | ICD-10-CM

## 2025-07-15 LAB
ALBUMIN SERPL-MCNC: 4.7 G/DL (ref 3.5–5.2)
ALBUMIN/GLOB SERPL: 1.6 G/DL
ALP SERPL-CCNC: 71 U/L (ref 39–117)
ALT SERPL W P-5'-P-CCNC: 38 U/L (ref 1–41)
ANION GAP SERPL CALCULATED.3IONS-SCNC: 12.6 MMOL/L (ref 5–15)
AST SERPL-CCNC: 25 U/L (ref 1–40)
BACTERIA UR QL AUTO: ABNORMAL /HPF
BASOPHILS # BLD AUTO: 0.03 10*3/MM3 (ref 0–0.2)
BASOPHILS NFR BLD AUTO: 0.2 % (ref 0–1.5)
BILIRUB SERPL-MCNC: 0.6 MG/DL (ref 0–1.2)
BILIRUB UR QL STRIP: NEGATIVE
BUN SERPL-MCNC: 17.1 MG/DL (ref 6–20)
BUN/CREAT SERPL: 14.9 (ref 7–25)
CALCIUM SPEC-SCNC: 10.3 MG/DL (ref 8.6–10.5)
CHLORIDE SERPL-SCNC: 98 MMOL/L (ref 98–107)
CLARITY UR: ABNORMAL
CO2 SERPL-SCNC: 28.4 MMOL/L (ref 22–29)
COD CRY URNS QL: ABNORMAL /HPF
COLOR UR: YELLOW
CREAT SERPL-MCNC: 1.15 MG/DL (ref 0.76–1.27)
DEPRECATED RDW RBC AUTO: 36.6 FL (ref 37–54)
EGFRCR SERPLBLD CKD-EPI 2021: 82 ML/MIN/1.73
EOSINOPHIL # BLD AUTO: 0.14 10*3/MM3 (ref 0–0.4)
EOSINOPHIL NFR BLD AUTO: 1 % (ref 0.3–6.2)
ERYTHROCYTE [DISTWIDTH] IN BLOOD BY AUTOMATED COUNT: 13.1 % (ref 12.3–15.4)
GLOBULIN UR ELPH-MCNC: 2.9 GM/DL
GLUCOSE SERPL-MCNC: 133 MG/DL (ref 65–99)
GLUCOSE UR STRIP-MCNC: NEGATIVE MG/DL
HCT VFR BLD AUTO: 38.5 % (ref 37.5–51)
HGB BLD-MCNC: 12.7 G/DL (ref 13–17.7)
HGB UR QL STRIP.AUTO: ABNORMAL
HOLD SPECIMEN: NORMAL
HYALINE CASTS UR QL AUTO: ABNORMAL /LPF
IMM GRANULOCYTES # BLD AUTO: 0.02 10*3/MM3 (ref 0–0.05)
IMM GRANULOCYTES NFR BLD AUTO: 0.1 % (ref 0–0.5)
KETONES UR QL STRIP: NEGATIVE
LEUKOCYTE ESTERASE UR QL STRIP.AUTO: NEGATIVE
LIPASE SERPL-CCNC: 21 U/L (ref 13–60)
LYMPHOCYTES # BLD AUTO: 2.12 10*3/MM3 (ref 0.7–3.1)
LYMPHOCYTES NFR BLD AUTO: 15.9 % (ref 19.6–45.3)
MCH RBC QN AUTO: 25 PG (ref 26.6–33)
MCHC RBC AUTO-ENTMCNC: 33 G/DL (ref 31.5–35.7)
MCV RBC AUTO: 75.9 FL (ref 79–97)
MONOCYTES # BLD AUTO: 0.79 10*3/MM3 (ref 0.1–0.9)
MONOCYTES NFR BLD AUTO: 5.9 % (ref 5–12)
MUCOUS THREADS URNS QL MICRO: ABNORMAL /HPF
NEUTROPHILS NFR BLD AUTO: 10.24 10*3/MM3 (ref 1.7–7)
NEUTROPHILS NFR BLD AUTO: 76.9 % (ref 42.7–76)
NITRITE UR QL STRIP: NEGATIVE
PH UR STRIP.AUTO: 6 [PH] (ref 5–8)
PLATELET # BLD AUTO: 327 10*3/MM3 (ref 140–450)
PMV BLD AUTO: 10 FL (ref 6–12)
POTASSIUM SERPL-SCNC: 3.8 MMOL/L (ref 3.5–5.2)
PROT SERPL-MCNC: 7.6 G/DL (ref 6–8.5)
PROT UR QL STRIP: NEGATIVE
RBC # BLD AUTO: 5.07 10*6/MM3 (ref 4.14–5.8)
RBC # UR STRIP: ABNORMAL /HPF
REF LAB TEST METHOD: ABNORMAL
SODIUM SERPL-SCNC: 139 MMOL/L (ref 136–145)
SP GR UR STRIP: 1.02 (ref 1–1.03)
SQUAMOUS #/AREA URNS HPF: ABNORMAL /HPF
UROBILINOGEN UR QL STRIP: ABNORMAL
WBC # UR STRIP: ABNORMAL /HPF
WBC NRBC COR # BLD AUTO: 13.34 10*3/MM3 (ref 3.4–10.8)
WHOLE BLOOD HOLD COAG: NORMAL
WHOLE BLOOD HOLD SPECIMEN: NORMAL

## 2025-07-15 PROCEDURE — 25010000002 ONDANSETRON PER 1 MG: Performed by: EMERGENCY MEDICINE

## 2025-07-15 PROCEDURE — 74176 CT ABD & PELVIS W/O CONTRAST: CPT

## 2025-07-15 PROCEDURE — 96375 TX/PRO/DX INJ NEW DRUG ADDON: CPT

## 2025-07-15 PROCEDURE — 85025 COMPLETE CBC W/AUTO DIFF WBC: CPT | Performed by: EMERGENCY MEDICINE

## 2025-07-15 PROCEDURE — 96374 THER/PROPH/DIAG INJ IV PUSH: CPT

## 2025-07-15 PROCEDURE — 80053 COMPREHEN METABOLIC PANEL: CPT | Performed by: EMERGENCY MEDICINE

## 2025-07-15 PROCEDURE — 99284 EMERGENCY DEPT VISIT MOD MDM: CPT | Performed by: EMERGENCY MEDICINE

## 2025-07-15 PROCEDURE — 83690 ASSAY OF LIPASE: CPT | Performed by: EMERGENCY MEDICINE

## 2025-07-15 PROCEDURE — 81001 URINALYSIS AUTO W/SCOPE: CPT | Performed by: EMERGENCY MEDICINE

## 2025-07-15 PROCEDURE — 25010000002 KETOROLAC TROMETHAMINE PER 15 MG: Performed by: EMERGENCY MEDICINE

## 2025-07-15 RX ORDER — SODIUM CHLORIDE 9 MG/ML
10 INJECTION, SOLUTION INTRAMUSCULAR; INTRAVENOUS; SUBCUTANEOUS AS NEEDED
Status: DISCONTINUED | OUTPATIENT
Start: 2025-07-15 | End: 2025-07-15 | Stop reason: HOSPADM

## 2025-07-15 RX ORDER — HYDROCODONE BITARTRATE AND ACETAMINOPHEN 5; 325 MG/1; MG/1
1-2 TABLET ORAL EVERY 6 HOURS PRN
Qty: 15 TABLET | Refills: 0 | Status: SHIPPED | OUTPATIENT
Start: 2025-07-15

## 2025-07-15 RX ORDER — ONDANSETRON 2 MG/ML
4 INJECTION INTRAMUSCULAR; INTRAVENOUS ONCE
Status: COMPLETED | OUTPATIENT
Start: 2025-07-15 | End: 2025-07-15

## 2025-07-15 RX ORDER — TAMSULOSIN HYDROCHLORIDE 0.4 MG/1
1 CAPSULE ORAL DAILY
Qty: 12 CAPSULE | Refills: 0 | Status: SHIPPED | OUTPATIENT
Start: 2025-07-15

## 2025-07-15 RX ORDER — KETOROLAC TROMETHAMINE 30 MG/ML
30 INJECTION, SOLUTION INTRAMUSCULAR; INTRAVENOUS ONCE
Status: COMPLETED | OUTPATIENT
Start: 2025-07-15 | End: 2025-07-15

## 2025-07-15 RX ADMIN — ONDANSETRON 4 MG: 2 INJECTION INTRAMUSCULAR; INTRAVENOUS at 01:56

## 2025-07-15 RX ADMIN — KETOROLAC TROMETHAMINE 30 MG: 30 INJECTION INTRAMUSCULAR; INTRAVENOUS at 01:56

## 2025-07-15 NOTE — FSED PROVIDER NOTE
"Subjective  History of Present Illness:    Pt reports left lower back pain.  Pt reports pain is aggravated by laying flat and sitting.  Pain is alleviated by standing.  Pt reports testicular pain and abdominal pain.  Pt reports frequent urination.  Pt took tylenol at 7:30      Nurses Notes reviewed and agree, including vitals, allergies, social history and prior medical history.     REVIEW OF SYSTEMS:   Review of Systems   Genitourinary:  Positive for frequency.   Musculoskeletal:  Positive for back pain.       Past Medical History:   Diagnosis Date    Coronary artery calcification     Hypertension     Mixed hyperlipidemia     Type 2 diabetes mellitus without complication, without long-term current use of insulin     White coat syndrome with diagnosis of hypertension        Allergies:    Atorvastatin, Escitalopram, Lisinopril, and Sertraline      No past surgical history on file.      Social History     Socioeconomic History    Marital status:    Tobacco Use    Smoking status: Never     Passive exposure: Past    Smokeless tobacco: Never   Vaping Use    Vaping status: Never Used   Substance and Sexual Activity    Alcohol use: Not Currently    Drug use: Never    Sexual activity: Yes     Partners: Female     Birth control/protection: Condom         Family History   Problem Relation Age of Onset    Heart disease Mother     Heart disease Father     Mitral valve prolapse Brother     Coronary artery disease Other         Multiple family members with early CAD on Dad's side    Colon cancer Neg Hx     Prostate cancer Neg Hx     Stroke Neg Hx        Objective  Physical Exam:  /82   Pulse 72   Temp 97.8 °F (36.6 °C) (Oral)   Resp 18   Ht 193 cm (76\")   Wt 107 kg (235 lb 12.8 oz)   SpO2 96%   BMI 28.70 kg/m²      Physical Exam  Vitals and nursing note reviewed.   Constitutional:       Appearance: Normal appearance.   HENT:      Right Ear: External ear normal.      Left Ear: External ear normal.      Nose: " Nose normal.      Mouth/Throat:      Mouth: Mucous membranes are moist.   Eyes:      Extraocular Movements: Extraocular movements intact.   Cardiovascular:      Rate and Rhythm: Normal rate and regular rhythm.   Pulmonary:      Effort: Pulmonary effort is normal.      Breath sounds: Normal breath sounds.   Abdominal:      Palpations: Abdomen is soft.      Tenderness: There is no abdominal tenderness.   Musculoskeletal:         General: Normal range of motion.   Skin:     General: Skin is warm and dry.      Capillary Refill: Capillary refill takes less than 2 seconds.   Neurological:      General: No focal deficit present.      Mental Status: He is alert.   Psychiatric:         Mood and Affect: Mood normal.         Behavior: Behavior normal.         Thought Content: Thought content normal.         Procedures    ED Course:         Lab Results (last 24 hours)       Procedure Component Value Units Date/Time    CBC & Differential [[id number]]  (Abnormal) Collected: 07/15/25 0051    Specimen: Blood Updated: 07/15/25 0059    Narrative:      The following orders were created for panel order CBC & Differential.  Procedure                               Abnormality         Status                     ---------                               -----------         ------                     CBC Auto Differential[[id number]]        Abnormal            Final result                 Please view results for these tests on the individual orders.    Comprehensive Metabolic Panel [[id number]]  (Abnormal) Collected: 07/15/25 0051    Specimen: Blood Updated: 07/15/25 0117     Glucose 133 mg/dL      BUN 17.1 mg/dL      Creatinine 1.15 mg/dL      Sodium 139 mmol/L      Potassium 3.8 mmol/L      Chloride 98 mmol/L      CO2 28.4 mmol/L      Calcium 10.3 mg/dL      Total Protein 7.6 g/dL      Albumin 4.7 g/dL      ALT (SGPT) 38 U/L      AST (SGOT) 25 U/L      Alkaline Phosphatase 71 U/L      Total Bilirubin 0.6 mg/dL      Globulin 2.9 gm/dL      A/G  Ratio 1.6 g/dL      BUN/Creatinine Ratio 14.9     Anion Gap 12.6 mmol/L      eGFR 82.0 mL/min/1.73     Narrative:      GFR Categories in Chronic Kidney Disease (CKD)              GFR Category          GFR (mL/min/1.73)    Interpretation  G1                    90 or greater        Normal or high (1)  G2                    60-89                Mild decrease (1)  G3a                   45-59                Mild to moderate decrease  G3b                   30-44                Moderate to severe decrease  G4                    15-29                Severe decrease  G5                    14 or less           Kidney failure    (1)In the absence of evidence of kidney disease, neither GFR category G1 or G2 fulfill the criteria for CKD.    eGFR calculation 2021 CKD-EPI creatinine equation, which does not include race as a factor    Lipase [[id number]]  (Normal) Collected: 07/15/25 0051    Specimen: Blood Updated: 07/15/25 0116     Lipase 21 U/L     CBC Auto Differential [[id number]]  (Abnormal) Collected: 07/15/25 0051    Specimen: Blood Updated: 07/15/25 0059     WBC 13.34 10*3/mm3      RBC 5.07 10*6/mm3      Hemoglobin 12.7 g/dL      Hematocrit 38.5 %      MCV 75.9 fL      MCH 25.0 pg      MCHC 33.0 g/dL      RDW 13.1 %      RDW-SD 36.6 fl      MPV 10.0 fL      Platelets 327 10*3/mm3      Neutrophil % 76.9 %      Lymphocyte % 15.9 %      Monocyte % 5.9 %      Eosinophil % 1.0 %      Basophil % 0.2 %      Immature Grans % 0.1 %      Neutrophils, Absolute 10.24 10*3/mm3      Lymphocytes, Absolute 2.12 10*3/mm3      Monocytes, Absolute 0.79 10*3/mm3      Eosinophils, Absolute 0.14 10*3/mm3      Basophils, Absolute 0.03 10*3/mm3      Immature Grans, Absolute 0.02 10*3/mm3     Urinalysis With Microscopic If Indicated (No Culture) - Urine, Clean Catch [[id number]]  (Abnormal) Collected: 07/15/25 0206    Specimen: Urine, Clean Catch Updated: 07/15/25 0211     Color, UA Yellow     Appearance, UA Slightly Cloudy     pH, UA 6.0      Specific Gravity, UA 1.020     Glucose, UA Negative     Ketones, UA Negative     Bilirubin, UA Negative     Blood, UA Large (3+)     Protein, UA Negative     Leuk Esterase, UA Negative     Nitrite, UA Negative     Urobilinogen, UA 0.2 E.U./dL    Urinalysis, Microscopic Only - Urine, Clean Catch [[id number]]  (Abnormal) Collected: 07/15/25 0206    Specimen: Urine, Clean Catch Updated: 07/15/25 0234     RBC, UA Too Numerous to Count /HPF      WBC, UA 3-5 /HPF      Bacteria, UA Trace /HPF      Squamous Epithelial Cells, UA 0-2 /HPF      Hyaline Casts, UA 0-2 /LPF      Calcium Oxalate Crystals, UA Small/1+ /HPF      Mucus, UA Trace /HPF      Methodology Manual Light Microscopy             CT Abdomen Pelvis Stone Protocol  Result Date: 7/15/2025  CT ABDOMEN PELVIS STONE PROTOCOL Date of Exam: 7/15/2025 1:00 AM EDT Indication: flank pain. Comparison: None available. Technique: Axial CT images were obtained of the abdomen and pelvis without the administration of contrast. Reconstructed coronal and sagittal images were also obtained. Automated exposure control and iterative construction methods were used. Findings: Lung Bases:   The visualized lung bases and lower mediastinal structures are unremarkable. Liver: Liver is normal in size and CT density. No focal lesions. Biliary/Gallbladder:  The gallbladder is normal without evidence of radiopaque stones. The biliary tree is nondilated. Spleen: Spleen is normal in size and CT density. Pancreas:  Pancreas is normal. There is no evidence of pancreatic mass or peripancreatic fluid. Kidneys:  There is mild left-sided hydronephrosis secondary to a 3 mm proximal left ureteral stone. There is no right-sided hydronephrosis or stone. Adrenals:  Adrenal glands are unremarkable. Retroperitoneal/Lymph Nodes/Vasculature:  No retroperitoneal adenopathy is identified. Gastrointestinal/Mesentery:  The bowel loops are non-dilated without wall thickening or mass. The appendix appears within  normal limits. No evidence of obstruction. No free air. No mesenteric fluid collections identified. Bladder:  The bladder is normal. Genital:   Unremarkable       Bony Structures:   Visualized bony structures are consistent with the patient's age.     Impression: Impression: Mild left-sided hydronephrosis secondary to a 3 mm proximal left ureteral stone. Electronically Signed: Landon Kilpatrick MD  7/15/2025 1:49 AM EDT  Workstation ID: [id number]         [facility]     Amount and/or Complexity of Data Reviewed  Clinical lab tests: reviewed  Tests in the radiology section of CPT®: reviewed        Initial impression of presenting illness: Flank pain    DDX: includes but is not limited to: I estimate there is LOW risk for (including but not limited to) ACUTE APPENDICITIS, BOWEL OBSTRUCTION, ACUTE CHOLECYSTITIS, RUPTURED DIVERTICULITIS, INCARCERATED or STRANGULATED HERNIA, HEMMORHAGIC PANCREATITIS, or PERFORATED BOWEL/ULCER, thus I consider the discharge disposition reasonable. Johnny Esteves (or their surrogate) and I have discussed the diagnosis and risks, and we agree with discharging home with close follow-up. We also discussed returning to the Emergency Department immediately if new or worsening symptoms occur. We have discussed the symptoms which are most concerning that necessitate immediate return.     Patient arrives ambulatory with vitals interpreted by myself.     Pertinent features from physical exam: Normal exam.    Initial diagnostic plan: Labs and imaging    Results from initial plan were reviewed and interpreted by me revealing consistent with obstructing stone        Interventions / Re-evaluation: Patient reports feeling better    Medications   Sodium Chloride (PF) 0.9 % 10 mL (has no administration in time range)   ondansetron (ZOFRAN) injection 4 mg (4 mg Intravenous Given 7/15/25 0156)   ketorolac (TORADOL) injection 30 mg (30 mg Intravenous Given 7/15/25 0156)       Results/clinical rationale were  patient      Data interpreted: Nursing notes reviewed, vital signs reviewed.  CBC with differential and CMP Images independantly reviewed and CT of abdomen/pelvis results reviewed independently interpreted by me.  EKG independently interpreted by me.  O2 saturation:    Care significantly affected by Social Determinants of Health (housing and economic circumstances, unemployment)               [] Yes     [x] No              If yes, Patient's care significantly limited by  Social Determinants of Health including:              [] Inadequate housing              [] Low income              [] Alcoholism and drug addiction in family              [] Problems related to primary support group              [] Unemployment              [] Problems related to employment              [] Other Social Determinants of Health:     Counseling: Discussed the results above with the patient regarding need for discharged home. Return precautions were given to patient and patient's spouse.  Patient understands and agrees plan of care.  The patient was given anticipatory guidance and return precautions and advised of the need for urgent follow up.     -----  ED Disposition       ED Disposition   Discharge    Condition   Stable    Comment   --             Final diagnoses:   Hydroureteronephrosis   Ureterolithiasis      Your Follow-Up Providers       Go to  [facility] EMERGENCY DEPARTMENT [place].    Specialty: Emergency Medicine  Follow up details: If symptoms worsen  [address] [facility] [address]  [facility] [id number] [phone]             Emma Kathleen MD.    Specialty: Internal Medicine  [address] [facility]  [address]  [place] [id number] [phone]               Benoit Akhtar MD .    Specialty: Urology  [address] [facility]  [address]  [facility] [address]  [phone]                       Contact information for after-discharge care    Follow-up information has not been  specified.                    Your medication list        START taking these medications        Instructions Last Dose Given Next Dose Due   HYDROcodone-acetaminophen 5-325 MG per tablet  Commonly known as: NORCO      Take 1-2 tablets by mouth Every 6 (Six) Hours As Needed for Mild Pain or Moderate Pain.       tamsulosin 0.4 MG capsule 24 hr capsule  Commonly known as: Flomax      Take 1 capsule by mouth Daily. Discontinue if stone passes or lightheaded when upright.              CONTINUE taking these medications        Instructions Last Dose Given Next Dose Due   albuterol sulfate  (90 Base) MCG/ACT inhaler  Commonly known as: PROVENTIL HFA;VENTOLIN HFA;PROAIR HFA      Inhale 2 puffs Every 4 (Four) Hours As Needed for Wheezing.       amLODIPine 10 MG tablet  Commonly known as: NORVASC      Take 1 tablet by mouth Daily.       Aspirin Low Dose 81 MG EC tablet  Generic drug: aspirin      Take 1 tablet by mouth Daily.       hydroCHLOROthiazide 25 MG tablet      Take 1 tablet by mouth Daily.       metFORMIN  MG 24 hr tablet  Commonly known as: GLUCOPHAGE-XR      Take 2 tablets by mouth Daily With Breakfast.       propranolol 10 MG tablet  Commonly known as: INDERAL      Take 1 tablet by mouth As Needed.       simvastatin 20 MG tablet  Commonly known as: ZOCOR      Take 1 tablet by mouth Daily.       Tirzepatide 7.5 MG/0.5ML solution auto-injector  Commonly known as: Mounjaro      Inject 0.5 mL under the skin into the appropriate area as directed 1 (One) Time Per Week.       valsartan 160 MG tablet  Commonly known as: DIOVAN      Take 2 tablets by mouth Daily.                 Where to Get Your Medications        These medications were sent to [facility] [id number] - [place], [place] - [address] [place] [address] [address] & [address] - [phone]  - [phone] [address] [address]      Phone: [phone]   HYDROcodone-acetaminophen 5-325 MG per tablet  tamsulosin 0.4 MG capsule 24 hr  capsule

## 2025-07-15 NOTE — TELEPHONE ENCOUNTER
Spoke with patient on the phone and advised Ibuprofen 800mg q8hr PRN and if not getting relief to let me know. Cr stable on blood work from ED.

## 2025-07-29 RX ORDER — AMLODIPINE BESYLATE 10 MG/1
10 TABLET ORAL DAILY
Qty: 90 TABLET | Refills: 1 | Status: SHIPPED | OUTPATIENT
Start: 2025-07-29

## 2025-07-29 NOTE — TELEPHONE ENCOUNTER
Rx Refill Note  Requested Prescriptions     Pending Prescriptions Disp Refills    amLODIPine (NORVASC) 10 MG tablet 90 tablet 1     Sig: Take 1 tablet by mouth Daily.      Last office visit with prescribing clinician: 4/11/2025   Last telemedicine visit with prescribing clinician: Visit date not found   Next office visit with prescribing clinician: 8/11/2025       Lashon Bee MA  07/29/25, 11:43 EDT

## 2025-08-06 ENCOUNTER — OFFICE VISIT (OUTPATIENT)
Dept: UROLOGY | Facility: CLINIC | Age: 40
End: 2025-08-06
Payer: COMMERCIAL

## 2025-08-06 VITALS — BODY MASS INDEX: 28.62 KG/M2 | HEIGHT: 76 IN | WEIGHT: 235 LBS

## 2025-08-06 DIAGNOSIS — R30.0 DYSURIA: Primary | ICD-10-CM

## 2025-08-06 DIAGNOSIS — N20.0 NEPHROLITHIASIS: ICD-10-CM

## 2025-08-06 LAB
BILIRUB BLD-MCNC: ABNORMAL MG/DL
CLARITY, POC: CLEAR
COLOR UR: YELLOW
EXPIRATION DATE: ABNORMAL
GLUCOSE UR STRIP-MCNC: NEGATIVE MG/DL
KETONES UR QL: NEGATIVE
LEUKOCYTE EST, POC: NEGATIVE
Lab: ABNORMAL
NITRITE UR-MCNC: NEGATIVE MG/ML
PH UR: 6 [PH] (ref 5–8)
PROT UR STRIP-MCNC: ABNORMAL MG/DL
RBC # UR STRIP: NEGATIVE /UL
SP GR UR: 1.01 (ref 1–1.03)
UROBILINOGEN UR QL: ABNORMAL

## 2025-08-06 PROCEDURE — 81003 URINALYSIS AUTO W/O SCOPE: CPT | Performed by: UROLOGY

## 2025-08-06 PROCEDURE — 99204 OFFICE O/P NEW MOD 45 MIN: CPT | Performed by: UROLOGY

## 2025-08-11 ENCOUNTER — OFFICE VISIT (OUTPATIENT)
Age: 40
End: 2025-08-11
Payer: COMMERCIAL

## 2025-08-11 VITALS
OXYGEN SATURATION: 100 % | SYSTOLIC BLOOD PRESSURE: 150 MMHG | HEART RATE: 68 BPM | DIASTOLIC BLOOD PRESSURE: 72 MMHG | WEIGHT: 235.8 LBS | BODY MASS INDEX: 29.32 KG/M2 | HEIGHT: 75 IN

## 2025-08-11 DIAGNOSIS — Z00.00 WELLNESS EXAMINATION: Primary | ICD-10-CM

## 2025-08-11 DIAGNOSIS — E78.2 MIXED HYPERLIPIDEMIA: ICD-10-CM

## 2025-08-11 DIAGNOSIS — E11.9 TYPE 2 DIABETES MELLITUS WITHOUT COMPLICATION, WITHOUT LONG-TERM CURRENT USE OF INSULIN: ICD-10-CM

## 2025-08-11 PROBLEM — N20.0 NEPHROLITHIASIS: Status: ACTIVE | Noted: 2025-08-11

## 2025-08-11 LAB
EXPIRATION DATE: ABNORMAL
HBA1C MFR BLD: 6.6 % (ref 4.5–5.7)
Lab: ABNORMAL

## 2025-08-14 ENCOUNTER — LAB (OUTPATIENT)
Age: 40
End: 2025-08-14
Payer: COMMERCIAL

## 2025-08-14 ENCOUNTER — HOSPITAL ENCOUNTER (OUTPATIENT)
Facility: HOSPITAL | Age: 40
Discharge: HOME | End: 2025-08-14
Admitting: UROLOGY
Payer: COMMERCIAL

## 2025-08-14 DIAGNOSIS — E78.2 MIXED HYPERLIPIDEMIA: ICD-10-CM

## 2025-08-14 DIAGNOSIS — E11.9 TYPE 2 DIABETES MELLITUS WITHOUT COMPLICATION, WITHOUT LONG-TERM CURRENT USE OF INSULIN: ICD-10-CM

## 2025-08-14 DIAGNOSIS — N20.0 NEPHROLITHIASIS: ICD-10-CM

## 2025-08-14 LAB
CHOLEST SERPL-MCNC: 134 MG/DL (ref 0–200)
HBA1C MFR BLD: 6.3 % (ref 4.8–5.6)
HDLC SERPL-MCNC: 34 MG/DL (ref 40–60)
LDLC SERPL CALC-MCNC: 74 MG/DL (ref 0–100)
LDLC/HDLC SERPL: 2.05 {RATIO}
TRIGL SERPL-MCNC: 151 MG/DL (ref 0–150)
VLDLC SERPL-MCNC: 26 MG/DL (ref 5–40)

## 2025-08-14 PROCEDURE — 80061 LIPID PANEL: CPT | Performed by: INTERNAL MEDICINE

## 2025-08-14 PROCEDURE — 74176 CT ABD & PELVIS W/O CONTRAST: CPT

## 2025-08-14 PROCEDURE — 36415 COLL VENOUS BLD VENIPUNCTURE: CPT | Performed by: INTERNAL MEDICINE

## 2025-08-14 PROCEDURE — 83036 HEMOGLOBIN GLYCOSYLATED A1C: CPT | Performed by: INTERNAL MEDICINE

## 2025-08-15 ENCOUNTER — RESULTS FOLLOW-UP (OUTPATIENT)
Age: 40
End: 2025-08-15
Payer: COMMERCIAL

## 2025-08-15 DIAGNOSIS — N20.0 NEPHROLITHIASIS: Primary | ICD-10-CM

## 2025-08-15 RX ORDER — TAMSULOSIN HYDROCHLORIDE 0.4 MG/1
1 CAPSULE ORAL DAILY
Qty: 30 CAPSULE | Refills: 0 | Status: SHIPPED | OUTPATIENT
Start: 2025-08-15

## 2025-08-15 RX ORDER — OXYCODONE AND ACETAMINOPHEN 5; 325 MG/1; MG/1
1 TABLET ORAL EVERY 6 HOURS PRN
Qty: 12 TABLET | Refills: 0 | Status: SHIPPED | OUTPATIENT
Start: 2025-08-15

## 2025-08-20 ENCOUNTER — OFFICE VISIT (OUTPATIENT)
Dept: UROLOGY | Facility: CLINIC | Age: 40
End: 2025-08-20
Payer: COMMERCIAL

## 2025-08-20 ENCOUNTER — TELEPHONE (OUTPATIENT)
Dept: CARDIOLOGY | Facility: CLINIC | Age: 40
End: 2025-08-20
Payer: COMMERCIAL

## 2025-08-20 VITALS — HEIGHT: 75 IN | WEIGHT: 235 LBS | BODY MASS INDEX: 29.22 KG/M2

## 2025-08-20 DIAGNOSIS — I10 PRIMARY HYPERTENSION: ICD-10-CM

## 2025-08-20 DIAGNOSIS — N20.0 NEPHROLITHIASIS: Primary | ICD-10-CM

## 2025-08-20 PROBLEM — N20.1 LEFT URETERAL STONE: Status: ACTIVE | Noted: 2025-08-20

## 2025-08-20 PROCEDURE — 87086 URINE CULTURE/COLONY COUNT: CPT | Performed by: UROLOGY

## 2025-08-20 PROCEDURE — 99214 OFFICE O/P EST MOD 30 MIN: CPT | Performed by: UROLOGY

## 2025-08-20 RX ORDER — VALSARTAN 160 MG/1
320 TABLET ORAL DAILY
Qty: 60 TABLET | Refills: 11 | Status: CANCELLED | OUTPATIENT
Start: 2025-08-20

## 2025-08-20 RX ORDER — VALSARTAN 320 MG/1
320 TABLET ORAL DAILY
Qty: 90 TABLET | Refills: 0 | Status: SHIPPED | OUTPATIENT
Start: 2025-08-20

## 2025-08-22 LAB — BACTERIA SPEC AEROBE CULT: NO GROWTH
